# Patient Record
(demographics unavailable — no encounter records)

---

## 2024-12-06 NOTE — ADDENDUM
[FreeTextEntry1] : MNG\par  r/b/a to thyroid biopsy, management of thyroid nodules reviewed. 6/2023 L low cystic nodule w/ septations, avascular, no solid component, 1.6 x 0.8 x 1 cm, repeat US w/ me in 1 year. Verbalized understanding and agrees with treatment plan, will contact MD and seek emergency medical care if condition changes.\par

## 2024-12-06 NOTE — PROCEDURE
[FreeTextEntry1] : POC US of the Neck 12/2024, images stored on local hard drive.  Indication: Surveillance  Comparison: 2023  Findings:  The thyroid parenchyma is atrophic, heterogenous, mostly isoechoic and exhibits reduced vascularity throughout the gland.  The right thyroid lobe measure 3 x 1.5 x 1.7. It contains no nodules.  The isthmus measures 0.4 cm and exhibits no discernible nodules.  The left thyroid lobe measures 3.9 x 2.3 x 1.6 cm and exhibits the following nodules:  Left low cystic nodule w/ septations measuring 1.4 x 0.6 x 0.9 cm, regular borders w/o calcifications  There are no distinct parathyroids identified on this examination.  No concerning lymphadenopathy observed on bilateral examination.

## 2024-12-06 NOTE — HISTORY OF PRESENT ILLNESS
[FreeTextEntry1] : Ms. GRACIA is a 73year female with pmhx of HTN, epilepsy who presents for thyroid evaluation.   12/2024 Here for /fu, generally feels well and endorses no acute complaints. No interval events since LV. Today comes for annual thyroid surveillance. She otherwise denies any f/c, CP, SOB, palpitations, tremors, depressed mood, anxiety, palpitations, n/v, stool/urinary abn, skin/weight changes, heat/cold intolerance, HAs, breast/nipple changes, polyuria/polydipsia/nocturia or other complaints. she again denies any dysphagia, hoarseness, neck tenderness or new palpable masses. she again denies any family history of thyroid disorders or personal exposure to ionizing radiation.  Interval history PCP Dr Oleg Sanchez  Endorses sometimes feeling enlargement in thyroid Thyroid US "some time ago" In the 1990s found to have enlarged gland on PE, thyroid US performed, does not believe thyroid nodules found  On Monday, bloodwork performed, was informed to watch sugar and cholesterol Thyroid not evaluated with these labs, per patient, no labs for review  Denies fatigue, cold intolerance, constipation, weight gain, impaired concentration, dry skin, edema, depression, mood changes, muscle cramps, myalgia, and reduced fertility. Denies heat intolerance, palpitations, dyspnea, tremulousness, menstrual irregularities, hyperdefecation, weight loss, increased appetite, proximal muscle weakness, insomnia, and mood disturbances  No fhx of thyroid disease No personal h/o radiation

## 2024-12-06 NOTE — PHYSICAL EXAM
[Alert] : alert [No Acute Distress] : no acute distress [Normal Voice/Communication] : normal voice communication [Normal Hearing] : hearing was normal [No Neck Mass] : no neck mass was observed [No LAD] : no lymphadenopathy [No Thyroid Nodules] : no palpable thyroid nodules [No Respiratory Distress] : no respiratory distress [Normal Rate and Effort] : normal respiratory rate and effort [Clear to Auscultation] : lungs were clear to auscultation bilaterally [Normal Rate] : heart rate was normal [Regular Rhythm] : with a regular rhythm [Oriented x3] : oriented to person, place, and time [Normal Affect] : the affect was normal [Normal Insight/Judgement] : insight and judgment were intact [Normal Mood] : the mood was normal [de-identified] : thyroid fullness, nontender to palpation [de-identified] : ambulates with cane

## 2024-12-06 NOTE — ASSESSMENT
[FreeTextEntry1] : 1. Thyromegaly Endorses being evaluated for thyroid enlargement initially noted on PE in the 1990s Per patient, thyroid US performed at that time, does not believe thyroid nodules found Presents today for thyroid evaluation, as feels thyroid fullness appears clinically euthyroid Mild thyroid fullness appreciated on PE without tenderness to palpation or discrete nodule -- 2024 US stable, repeat in 1-2 years. -- TFTs ordered   Labs today, I will call/[facility] message with results Schedule thyroid US, I will call/[facility] message with results Follow up pendings labs